# Patient Record
Sex: FEMALE | Race: WHITE | URBAN - METROPOLITAN AREA
[De-identification: names, ages, dates, MRNs, and addresses within clinical notes are randomized per-mention and may not be internally consistent; named-entity substitution may affect disease eponyms.]

---
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
## 2020-12-15 ENCOUNTER — OTHER (OUTPATIENT)
Dept: URBAN - METROPOLITAN AREA CLINIC 18 | Facility: CLINIC | Age: 25
Setting detail: DERMATOLOGY
End: 2020-12-15

## 2020-12-15 DIAGNOSIS — Z86.03 PERSONAL HISTORY OF NEOPLASM OF UNCERTAIN BEHAVIOR: ICD-10-CM

## 2020-12-15 DIAGNOSIS — L81.4 OTHER MELANIN HYPERPIGMENTATION: ICD-10-CM

## 2020-12-15 DIAGNOSIS — L82.1 OTHER SEBORRHEIC KERATOSIS: ICD-10-CM

## 2020-12-15 DIAGNOSIS — D18.01 HEMANGIOMA OF SKIN AND SUBCUTANEOUS TISSUE: ICD-10-CM

## 2020-12-15 DIAGNOSIS — Z85.828 PERSONAL HISTORY OF OTHER MALIGNANT NEOPLASM OF SKIN: ICD-10-CM

## 2020-12-15 DIAGNOSIS — L57.8 OTHER SKIN CHANGES DUE TO CHRONIC EXPOSURE TO NONIONIZING RADIATION: ICD-10-CM

## 2020-12-15 DIAGNOSIS — D22.9 MELANOCYTIC NEVI, UNSPECIFIED: ICD-10-CM

## 2020-12-15 PROBLEM — L91.0 HYPERTROPHIC SCAR: Status: RESOLVED | Noted: 2020-12-15

## 2020-12-15 PROCEDURE — 17110 DESTRUCT B9 LESION 1-14: CPT

## 2020-12-15 PROCEDURE — 11900 INJECT SKIN LESIONS </W 7: CPT

## 2020-12-15 PROCEDURE — 99203 OFFICE O/P NEW LOW 30 MIN: CPT

## 2020-12-15 PROCEDURE — 11102 TANGNTL BX SKIN SINGLE LES: CPT

## 2020-12-19 PROBLEM — L90.5 SCAR CONDITIONS AND FIBROSIS OF SKIN: Status: RESOLVED | Noted: 2020-12-19

## 2020-12-19 PROBLEM — B07.8 OTHER VIRAL WARTS: Status: RESOLVED | Noted: 2020-12-19

## 2021-01-13 ENCOUNTER — EXCISION (OUTPATIENT)
Dept: URBAN - METROPOLITAN AREA CLINIC 18 | Facility: CLINIC | Age: 26
Setting detail: DERMATOLOGY
End: 2021-01-13

## 2021-01-13 DIAGNOSIS — D48.5 NEOPLASM OF UNCERTAIN BEHAVIOR OF SKIN: ICD-10-CM

## 2021-01-13 PROBLEM — C44.519 BASAL CELL CARCINOMA OF SKIN OF OTHER PART OF TRUNK: Status: RESOLVED | Noted: 2021-01-13

## 2021-01-13 PROCEDURE — 12032 INTMD RPR S/A/T/EXT 2.6-7.5: CPT

## 2021-01-13 PROCEDURE — 11602 EXC TR-EXT MAL+MARG 1.1-2 CM: CPT

## 2021-01-27 ENCOUNTER — CRYOTHERAPY (OUTPATIENT)
Dept: URBAN - METROPOLITAN AREA CLINIC 18 | Facility: CLINIC | Age: 26
Setting detail: DERMATOLOGY
End: 2021-01-27

## 2021-01-27 DIAGNOSIS — D48.5 NEOPLASM OF UNCERTAIN BEHAVIOR OF SKIN: ICD-10-CM

## 2021-01-27 PROBLEM — L91.0 HYPERTROPHIC SCAR: Status: RESOLVED | Noted: 2021-01-27

## 2021-01-27 PROCEDURE — 11900 INJECT SKIN LESIONS </W 7: CPT

## 2021-01-27 PROCEDURE — 17110 DESTRUCT B9 LESION 1-14: CPT

## 2021-01-30 PROBLEM — B07.0 PLANTAR WART: Status: RESOLVED | Noted: 2021-01-30

## 2021-07-23 ENCOUNTER — FOLLOW-UP (OUTPATIENT)
Dept: URBAN - METROPOLITAN AREA CLINIC 18 | Facility: CLINIC | Age: 26
Setting detail: DERMATOLOGY
End: 2021-07-23

## 2021-07-23 DIAGNOSIS — Z85.828 PERSONAL HISTORY OF OTHER MALIGNANT NEOPLASM OF SKIN: ICD-10-CM

## 2021-07-23 DIAGNOSIS — L90.5 SCAR CONDITIONS AND FIBROSIS OF SKIN: ICD-10-CM

## 2021-07-23 DIAGNOSIS — Z87.2 PERSONAL HISTORY OF DISEASES OF THE SKIN AND SUBCUTANEOUS TISSUE: ICD-10-CM

## 2021-07-23 DIAGNOSIS — L82.1 OTHER SEBORRHEIC KERATOSIS: ICD-10-CM

## 2021-07-23 DIAGNOSIS — Z85.820 PERSONAL HISTORY OF MALIGNANT MELANOMA OF SKIN: ICD-10-CM

## 2021-07-23 DIAGNOSIS — D22.5 MELANOCYTIC NEVI OF TRUNK: ICD-10-CM

## 2021-07-23 DIAGNOSIS — L81.4 OTHER MELANIN HYPERPIGMENTATION: ICD-10-CM

## 2021-07-23 PROBLEM — D18.01 HEMANGIOMA OF SKIN AND SUBCUTANEOUS TISSUE: Status: RESOLVED | Noted: 2021-07-23

## 2021-07-23 PROBLEM — L91.0 HYPERTROPHIC SCAR: Status: RESOLVED | Noted: 2021-07-23

## 2021-07-23 PROCEDURE — 11900 INJECT SKIN LESIONS </W 7: CPT

## 2021-07-23 PROCEDURE — 99213 OFFICE O/P EST LOW 20 MIN: CPT

## 2021-09-17 ENCOUNTER — FOLLOW-UP (OUTPATIENT)
Dept: URBAN - METROPOLITAN AREA CLINIC 18 | Facility: CLINIC | Age: 26
Setting detail: DERMATOLOGY
End: 2021-09-17

## 2021-09-17 DIAGNOSIS — D18.01 HEMANGIOMA OF SKIN AND SUBCUTANEOUS TISSUE: ICD-10-CM

## 2021-09-17 DIAGNOSIS — D22.5 MELANOCYTIC NEVI OF TRUNK: ICD-10-CM

## 2021-09-17 DIAGNOSIS — L81.4 OTHER MELANIN HYPERPIGMENTATION: ICD-10-CM

## 2021-09-17 DIAGNOSIS — D48.5 NEOPLASM OF UNCERTAIN BEHAVIOR OF SKIN: ICD-10-CM

## 2021-09-17 DIAGNOSIS — L82.1 OTHER SEBORRHEIC KERATOSIS: ICD-10-CM

## 2021-09-17 PROBLEM — L91.0 HYPERTROPHIC SCAR: Status: RESOLVED | Noted: 2021-09-17

## 2021-09-17 PROCEDURE — 11900 INJECT SKIN LESIONS </W 7: CPT

## 2022-01-03 ENCOUNTER — FOLLOW-UP (OUTPATIENT)
Dept: URBAN - METROPOLITAN AREA CLINIC 18 | Facility: CLINIC | Age: 27
Setting detail: DERMATOLOGY
End: 2022-01-03

## 2022-01-03 DIAGNOSIS — L57.0 ACTINIC KERATOSIS: ICD-10-CM

## 2022-01-03 PROBLEM — L82.1 OTHER SEBORRHEIC KERATOSIS: Status: RESOLVED | Noted: 2022-01-03

## 2022-01-03 PROBLEM — D18.01 HEMANGIOMA OF SKIN AND SUBCUTANEOUS TISSUE: Status: RESOLVED | Noted: 2022-01-03

## 2022-01-03 PROCEDURE — 99213 OFFICE O/P EST LOW 20 MIN: CPT

## 2022-01-03 PROCEDURE — 11102 TANGNTL BX SKIN SINGLE LES: CPT

## 2022-07-11 ENCOUNTER — APPOINTMENT (OUTPATIENT)
Dept: URBAN - METROPOLITAN AREA SURGERY 11 | Age: 27
Setting detail: DERMATOLOGY
End: 2022-07-11

## 2022-07-11 DIAGNOSIS — D18.0 HEMANGIOMA: ICD-10-CM

## 2022-07-11 DIAGNOSIS — Z85.828 PERSONAL HISTORY OF OTHER MALIGNANT NEOPLASM OF SKIN: ICD-10-CM

## 2022-07-11 DIAGNOSIS — L81.4 OTHER MELANIN HYPERPIGMENTATION: ICD-10-CM

## 2022-07-11 DIAGNOSIS — L82.1 OTHER SEBORRHEIC KERATOSIS: ICD-10-CM

## 2022-07-11 DIAGNOSIS — D22 MELANOCYTIC NEVI: ICD-10-CM

## 2022-07-11 PROBLEM — D22.9 MELANOCYTIC NEVI, UNSPECIFIED: Status: ACTIVE | Noted: 2022-07-11

## 2022-07-11 PROBLEM — D18.01 HEMANGIOMA OF SKIN AND SUBCUTANEOUS TISSUE: Status: ACTIVE | Noted: 2022-07-11

## 2022-07-11 PROCEDURE — OTHER COUNSELING: OTHER

## 2022-07-11 PROCEDURE — OTHER VITAMIN B3 COUNSELING: OTHER

## 2022-07-11 PROCEDURE — 99213 OFFICE O/P EST LOW 20 MIN: CPT

## 2022-07-11 PROCEDURE — OTHER REASSURANCE: OTHER

## 2023-04-13 ENCOUNTER — APPOINTMENT (OUTPATIENT)
Dept: URBAN - METROPOLITAN AREA SURGERY 11 | Age: 28
Setting detail: DERMATOLOGY
End: 2023-04-17

## 2023-04-13 DIAGNOSIS — D485 NEOPLASM OF UNCERTAIN BEHAVIOR OF SKIN: ICD-10-CM

## 2023-04-13 DIAGNOSIS — D18.0 HEMANGIOMA: ICD-10-CM

## 2023-04-13 DIAGNOSIS — L82.1 OTHER SEBORRHEIC KERATOSIS: ICD-10-CM

## 2023-04-13 DIAGNOSIS — Z85.828 PERSONAL HISTORY OF OTHER MALIGNANT NEOPLASM OF SKIN: ICD-10-CM

## 2023-04-13 DIAGNOSIS — D22 MELANOCYTIC NEVI: ICD-10-CM

## 2023-04-13 DIAGNOSIS — L81.4 OTHER MELANIN HYPERPIGMENTATION: ICD-10-CM

## 2023-04-13 DIAGNOSIS — L91.0 HYPERTROPHIC SCAR: ICD-10-CM

## 2023-04-13 PROBLEM — D22.9 MELANOCYTIC NEVI, UNSPECIFIED: Status: ACTIVE | Noted: 2023-04-13

## 2023-04-13 PROBLEM — D23.71 OTHER BENIGN NEOPLASM OF SKIN OF RIGHT LOWER LIMB, INCLUDING HIP: Status: ACTIVE | Noted: 2023-04-13

## 2023-04-13 PROBLEM — D48.5 NEOPLASM OF UNCERTAIN BEHAVIOR OF SKIN: Status: ACTIVE | Noted: 2023-04-13

## 2023-04-13 PROBLEM — D18.01 HEMANGIOMA OF SKIN AND SUBCUTANEOUS TISSUE: Status: ACTIVE | Noted: 2023-04-13

## 2023-04-13 PROCEDURE — OTHER VITAMIN B3 COUNSELING: OTHER

## 2023-04-13 PROCEDURE — 99213 OFFICE O/P EST LOW 20 MIN: CPT | Mod: 25

## 2023-04-13 PROCEDURE — OTHER REASSURANCE: OTHER

## 2023-04-13 PROCEDURE — OTHER MIPS QUALITY: OTHER

## 2023-04-13 PROCEDURE — 11102 TANGNTL BX SKIN SINGLE LES: CPT

## 2023-04-13 PROCEDURE — OTHER COUNSELING: OTHER

## 2023-04-13 PROCEDURE — OTHER ADDITIONAL NOTES: OTHER

## 2023-04-13 PROCEDURE — OTHER BIOPSY BY SHAVE METHOD: OTHER

## 2023-04-13 ASSESSMENT — LOCATION SIMPLE DESCRIPTION DERM
LOCATION SIMPLE: LEFT BREAST
LOCATION SIMPLE: ABDOMEN

## 2023-04-13 ASSESSMENT — LOCATION ZONE DERM: LOCATION ZONE: TRUNK

## 2023-04-13 ASSESSMENT — LOCATION DETAILED DESCRIPTION DERM
LOCATION DETAILED: LEFT INFRAMAMMARY CREASE (INNER QUADRANT)
LOCATION DETAILED: SUBXIPHOID

## 2023-04-13 NOTE — PROCEDURE: ADDITIONAL NOTES
Render Risk Assessment In Note?: no
Additional Notes: INC SPOT OF CONCERN
Detail Level: Simple
Additional Notes: prev hx of ILK